# Patient Record
Sex: MALE | Race: BLACK OR AFRICAN AMERICAN
[De-identification: names, ages, dates, MRNs, and addresses within clinical notes are randomized per-mention and may not be internally consistent; named-entity substitution may affect disease eponyms.]

---

## 2017-01-04 PROBLEM — Z00.00 ENCOUNTER FOR PREVENTIVE HEALTH EXAMINATION: Status: ACTIVE | Noted: 2017-01-04

## 2017-01-20 ENCOUNTER — APPOINTMENT (OUTPATIENT)
Dept: ORTHOPEDIC SURGERY | Facility: CLINIC | Age: 42
End: 2017-01-20

## 2017-01-20 VITALS — WEIGHT: 230 LBS | RESPIRATION RATE: 16 BRPM | BODY MASS INDEX: 28.01 KG/M2 | HEIGHT: 76 IN

## 2017-01-20 DIAGNOSIS — Z78.9 OTHER SPECIFIED HEALTH STATUS: ICD-10-CM

## 2017-01-20 DIAGNOSIS — M25.50 PAIN IN UNSPECIFIED JOINT: ICD-10-CM

## 2020-11-05 ENCOUNTER — APPOINTMENT (OUTPATIENT)
Dept: ORTHOPEDIC SURGERY | Facility: CLINIC | Age: 45
End: 2020-11-05
Payer: COMMERCIAL

## 2020-11-13 ENCOUNTER — APPOINTMENT (OUTPATIENT)
Dept: ORTHOPEDIC SURGERY | Facility: CLINIC | Age: 45
End: 2020-11-13
Payer: COMMERCIAL

## 2020-11-13 VITALS
SYSTOLIC BLOOD PRESSURE: 133 MMHG | HEART RATE: 73 BPM | WEIGHT: 228 LBS | HEIGHT: 76 IN | BODY MASS INDEX: 27.76 KG/M2 | DIASTOLIC BLOOD PRESSURE: 67 MMHG

## 2020-11-13 DIAGNOSIS — S62.621A DISPLACED FRACTURE OF MIDDLE PHALANX OF LEFT INDEX FINGER, INITIAL ENCOUNTER FOR CLOSED FRACTURE: ICD-10-CM

## 2020-11-13 DIAGNOSIS — M25.561 PAIN IN RIGHT KNEE: ICD-10-CM

## 2020-11-13 DIAGNOSIS — Z87.39 PERSONAL HISTORY OF OTHER DISEASES OF THE MUSCULOSKELETAL SYSTEM AND CONNECTIVE TISSUE: ICD-10-CM

## 2020-11-13 PROCEDURE — 73564 X-RAY EXAM KNEE 4 OR MORE: CPT | Mod: RT

## 2020-11-13 PROCEDURE — 99072 ADDL SUPL MATRL&STAF TM PHE: CPT

## 2020-11-13 PROCEDURE — 99203 OFFICE O/P NEW LOW 30 MIN: CPT

## 2020-11-13 NOTE — PHYSICAL EXAM
[DP] : dorsalis pedis 2+ and symmetric bilaterally [PT] : posterior tibial 2+ and symmetric bilaterally [de-identified] : Knees:\par Nonantalgic gait. On his full squatting he does feel some anterolateral RIGHT knee pain.\par - effusion.\par - erythema, edema, warmth.\par No significant point tenderness on exam including patellar facets, joint lines, femur or proximal tibia.\par ROM: 0 degrees extension to 135 degrees flexion. slight snap around the patellofemoral joint on the RIGHT knee going from full flexion to extension\par - Isabel.\par 1A Lachman.  - Pivot shift. - posterior drawer. Normal rotational, varus/valgus laxity.\par Intact extensor mechanism.\par NVI distally.\par high arches, indicate this heel/supinator. Thin calves [de-identified] : no respiratory distress or cough [de-identified] : \par x-rays of RT  knee weightbearing AP, lateral, merchant and 45° PA flexed views today show normal joint spaces.Very subtle lateral patellar tilt but otherwise good alignment. No significant osteophytes or any significant joint space narrowing No acute changes

## 2020-11-13 NOTE — ASSESSMENT
[FreeTextEntry1] : 45-year-old gentleman with RIGHT knee pain going on for a few months associated with exercise that was new or different involving a lot of squatting or bending of the knees. Running also aggravated his knee. Pain is mostly anterior and likely patellofemoral. Some of this pain is more lateral and he could have a small lateral meniscus tear although there is no swelling or loss of motion or any mechanical symptoms.I recommended first that he try some home exercises with stretching and strengthening. He should avoid squatting and lunges or if he does any they should keep the knee over the ankles and it should be pain free. Wall squats may be helpful.\par he could take anti-inflammatories such as ibuprofen or Aleve/naproxen for a week or 2 to quiet down any inflammation or pain. Ice after exercise her knee is hurting.\par if it's not better in the next 4-6 weeks he should come back in for followup. I would want to evaluate further by exam and also likely an MRI.

## 2020-11-13 NOTE — CONSULT LETTER
[Dear  ___] : Dear  [unfilled], [Consult Letter:] : I had the pleasure of evaluating your patient, [unfilled]. [Please see my note below.] : Please see my note below. [Consult Closing:] : Thank you very much for allowing me to participate in the care of this patient.  If you have any questions, please do not hesitate to contact me. [Sincerely,] : Sincerely, [FreeTextEntry2] : Joseph Stanley MD [FreeTextEntry3] : Janee Boateng MD\par Orthopedic Surgery\par Sports Medicine\par

## 2020-11-13 NOTE — HISTORY OF PRESENT ILLNESS
[de-identified] : Mr. Russell is a 46 yo Right-hand dominant gentleman who presents for pain in his  RIGHT knee that started a couple months ago associated with working out with a . He started doing virtual workouts and he also did start some running. He had run in the past without a problem but not recently. He would run up to 3 miles. He gets pain intermittently usually associated with certain exercises such as lunging or steps or bending the knee. Pain is typically anterior little bit lateral. There hasn't been any swelling or locking or buckling. No prior knee injuries.

## 2020-12-04 PROBLEM — M22.2X1 PATELLOFEMORAL PAIN SYNDROME OF RIGHT KNEE: Status: ACTIVE | Noted: 2020-11-13

## 2020-12-07 ENCOUNTER — APPOINTMENT (OUTPATIENT)
Dept: ORTHOPEDIC SURGERY | Facility: CLINIC | Age: 45
End: 2020-12-07
Payer: COMMERCIAL

## 2020-12-07 DIAGNOSIS — M22.2X1 PATELLOFEMORAL DISORDERS, RIGHT KNEE: ICD-10-CM

## 2020-12-07 PROCEDURE — 99212 OFFICE O/P EST SF 10 MIN: CPT | Mod: 95

## 2020-12-07 NOTE — REASON FOR VISIT
[Medical Office: ([facility])___] : at the medical office located in  [Verbal consent obtained from patient] : the patient, [unfilled] [Follow-Up Visit] : a follow-up visit for [Knee Pain] : knee pain [Home] : at home, [unfilled] , at the time of the visit.

## 2020-12-07 NOTE — PHYSICAL EXAM
[Normal RLE] : Right Lower Extremity: No scars, rashes, lesions, ulcers, skin intact [Normal LLE] : Left Lower Extremity: No scars, rashes, lesions, ulcers, skin intact [de-identified] : \par x-rays of RT  knee weightbearing AP, lateral, merchant and 45° PA flexed views November 2020 showed normal joint spaces.Very subtle lateral patellar tilt but otherwise good alignment. No significant osteophytes or any significant joint space narrowing No acute changes

## 2020-12-07 NOTE — ASSESSMENT
[FreeTextEntry1] : 45-year-old gentleman Who had RIGHT knee pain without any trauma but had been exercising a lot. His pain is gone with rest and modified exercise.\par He would like to be able to build up on his exercise and is wondering what he can and cannot do.\par I discussed with him various risks factors for knee osteoarthritis. He seems to be at low risk.\par he should be able to do progressive strengthening in his legs and hopefully get more active than perhaps would be able to do some degree of running. The RIGHT sneakers and a softer surface is preferable.\par Otherwise biking and elliptical her good exercises. He should just build up gradually. We will send him some strengthening exercises. Once the Covid pandemic is ending if he still having issues then we'll send him for formal physical therapy if he still has knee pain.\par Followup as needed.

## 2020-12-07 NOTE — HISTORY OF PRESENT ILLNESS
[de-identified] : Mr. Trevino Is a 45-year-old gentleman who presents for followup for his knee pain.\par After I saw him 4 Weeks ago, Up to all of the lunges and squats and running and his knee now is doing much better. He will has been working out with a  but really laying off any strain on his legs. Now that they're feeling better he was wondering what he can do and in terms of future exercises.\par He has no knee pain right now. No swelling or locking or buckling

## 2022-08-27 ENCOUNTER — NON-APPOINTMENT (OUTPATIENT)
Age: 47
End: 2022-08-27

## 2023-02-09 ENCOUNTER — APPOINTMENT (OUTPATIENT)
Dept: ORTHOPEDIC SURGERY | Facility: CLINIC | Age: 48
End: 2023-02-09
Payer: COMMERCIAL

## 2023-02-09 DIAGNOSIS — M67.912 UNSPECIFIED DISORDER OF SYNOVIUM AND TENDON, LEFT SHOULDER: ICD-10-CM

## 2023-02-09 DIAGNOSIS — M25.512 PAIN IN LEFT SHOULDER: ICD-10-CM

## 2023-02-09 PROCEDURE — 99214 OFFICE O/P EST MOD 30 MIN: CPT

## 2023-02-09 PROCEDURE — 73030 X-RAY EXAM OF SHOULDER: CPT | Mod: LT

## 2023-02-09 RX ORDER — DICLOFENAC SODIUM 50 MG/1
50 TABLET, DELAYED RELEASE ORAL TWICE DAILY
Qty: 30 | Refills: 1 | Status: ACTIVE | COMMUNITY
Start: 2023-02-09 | End: 1900-01-01

## 2023-02-09 NOTE — PHYSICAL EXAM
[UE] : Sensory: Intact in bilateral upper extremities [Normal RUE] : Right Upper Extremity: No scars, rashes, lesions, ulcers, skin intact [Normal LUE] : Left Upper Extremity: No scars, rashes, lesions, ulcers, skin intact [Normal Touch] : sensation intact for touch [Normal] : Oriented to person, place, and time, insight and judgement were intact and the affect was normal [Rad] : radial 2+ and symmetric bilaterally [de-identified] : LEFT shoulder\par No edema, ecchymoses, erythema, deformity.\par Active range of motion is with 180 degrees forward elevation without pain and internal rotation to T9 with pain on the left.  With the arms at the side there is about 70 degrees external rotation.\par In 90 degrees abduction there is 90 degrees external rotation with mild pain and 60 degrees internal rotation with the pain.\par Negative Neer.  Positive [place].  Pain with speeds.  Negative [place]'s.\par Motor 5/5 supraspinatus, internal rotation, external rotation, biceps.\par  [de-identified] : \par \par X-rays taken today of LEFT shoulder AP, Y lateral and axillary views showed no abnormalities.  No calcifications and no osteoarthritis

## 2023-02-09 NOTE — ASSESSMENT
[FreeTextEntry1] : 47-year-old right-hand-dominant with left shoulder pain intermittently over the past month most consistent with rotator cuff tendinopathy or bursitis.  He had been working out with a  but feels it was not hurting the shoulder and there was not an injury.\par I suggested trying some home exercises and physical therapy and taking a course of an anti-inflammatory.  Diclofenac was prescribed.\par He can take that for couple weeks.  Heat and ice.  He should avoid lifting above shoulder height temporarily to not aggravate the shoulder.\par \par If its not better in 6 to 8 weeks he should come in for follow-up.

## 2023-02-09 NOTE — HISTORY OF PRESENT ILLNESS
[de-identified] : Mr. Russell is now 47 years old.  He was last seen about 2 years ago for knee pain. \par He now comes in with pain in his LEFT shoulder for about 1 month with no injury or specific incident.  He is right-hand dominant.  He has pain with certain movements, pain is intermittent. He works out with a  but has taken a break to let his shoulder rest. He is not taking any pain medications. No popping in the shoulder or anything radiating down arm. \par No history of shoulder injury in the past.\par Pain is just intermittent with certain movements and not at rest.

## 2024-12-09 ENCOUNTER — OUTPATIENT (OUTPATIENT)
Dept: OUTPATIENT SERVICES | Facility: HOSPITAL | Age: 49
LOS: 1 days | End: 2024-12-09
Payer: COMMERCIAL

## 2024-12-09 PROCEDURE — 73600 X-RAY EXAM OF ANKLE: CPT

## 2024-12-09 PROCEDURE — 73630 X-RAY EXAM OF FOOT: CPT | Mod: 26,50

## 2024-12-09 PROCEDURE — 73600 X-RAY EXAM OF ANKLE: CPT | Mod: 26,50

## 2024-12-09 PROCEDURE — 73630 X-RAY EXAM OF FOOT: CPT

## 2025-02-14 ENCOUNTER — APPOINTMENT (OUTPATIENT)
Dept: ORTHOPEDIC SURGERY | Facility: CLINIC | Age: 50
End: 2025-02-14
Payer: COMMERCIAL

## 2025-02-14 DIAGNOSIS — M76.61 ACHILLES TENDINITIS, RIGHT LEG: ICD-10-CM

## 2025-02-14 DIAGNOSIS — M67.88 OTHER SPECIFIED DISORDERS OF SYNOVIUM AND TENDON, OTHER SITE: ICD-10-CM

## 2025-02-14 PROCEDURE — 99213 OFFICE O/P EST LOW 20 MIN: CPT
